# Patient Record
Sex: MALE
[De-identification: names, ages, dates, MRNs, and addresses within clinical notes are randomized per-mention and may not be internally consistent; named-entity substitution may affect disease eponyms.]

---

## 2022-09-15 ENCOUNTER — APPOINTMENT (OUTPATIENT)
Dept: ORTHOPEDIC SURGERY | Facility: CLINIC | Age: 72
End: 2022-09-15

## 2022-09-15 VITALS — WEIGHT: 250 LBS | HEIGHT: 69 IN | BODY MASS INDEX: 37.03 KG/M2

## 2022-09-15 DIAGNOSIS — M79.18 MYALGIA, OTHER SITE: ICD-10-CM

## 2022-09-15 DIAGNOSIS — Z86.39 PERSONAL HISTORY OF OTHER ENDOCRINE, NUTRITIONAL AND METABOLIC DISEASE: ICD-10-CM

## 2022-09-15 PROCEDURE — J3490M: CUSTOM

## 2022-09-15 PROCEDURE — 99214 OFFICE O/P EST MOD 30 MIN: CPT | Mod: 25

## 2022-09-15 PROCEDURE — 20610 DRAIN/INJ JOINT/BURSA W/O US: CPT

## 2022-09-15 PROCEDURE — 73564 X-RAY EXAM KNEE 4 OR MORE: CPT | Mod: LT

## 2022-09-15 NOTE — HISTORY OF PRESENT ILLNESS
[de-identified] : 72 year old male  ( RHD, retired )  left knee chronic pain worsen 8/2022 has seen Dr Chambers back on 7/2021 \par The pain is located  anterior, medial \par The pain is associated with  weakness, swelling , stiffness\par Worse with activity and better at rest.\par Has tried ice, icey hot, Tylenol \par

## 2022-09-15 NOTE — ASSESSMENT
[FreeTextEntry1] : Left X-Ray Examination of the KNEE (4 views): medial and patellofemoral degenerate changes.\par \par - We discussed their diagnosis and treatment options at length including the risks and benefits of both surgical and non-surgical options.\par - We will continue conservative treatment with activity modification, PT, icing, weight loss, and anti-inflammatory medications.\par - The patient was provided with a PT prescription to work on ROM, hip ER/abductors strengthening, quad/hamstring stretches and strengthening, and other exercises \par - The patient was advised to let pain guide the gradual advancement of activities. \par - We also discussed the possible of a corticosteroid injection in order to help decrease inflammation and pain so that they can perform better therapy.\par - The risks, benefits, and alternatives to corticosteroid injection were reviewed with the patient and they wished to proceed with this treatment course. \par - Follow up as needed  to re-evaluate, if no improvement we spoke about possibility of viscosupplementation injections\par

## 2022-09-15 NOTE — IMAGING
[de-identified] : \par LEFT KNEE\par Inspection:  mild effusion\par Palpation: medial joint line tenderness, anterior tenderness\par Knee Range of Motion:  3-125 \par Strength: 5/5 Quadriceps strength, 5/5 Hamstring strength\par Neurological: light touch is intact throughout\par Ligament Stability and Special Tests: \par McMurrays: neg\par Lachman: neg\par Pivot Shift: neg\par Posterior Drawer: neg\par Valgus: neg\par Varus: neg\par Patella Apprehension: neg\par Patella Maltracking: neg\par

## 2023-02-13 ENCOUNTER — APPOINTMENT (OUTPATIENT)
Dept: ORTHOPEDIC SURGERY | Facility: CLINIC | Age: 73
End: 2023-02-13
Payer: MEDICARE

## 2023-02-13 PROCEDURE — 99214 OFFICE O/P EST MOD 30 MIN: CPT | Mod: 25

## 2023-02-13 PROCEDURE — 20610 DRAIN/INJ JOINT/BURSA W/O US: CPT | Mod: LT

## 2023-02-13 PROCEDURE — J3490M: CUSTOM | Mod: NC

## 2023-02-13 NOTE — ASSESSMENT
[FreeTextEntry1] : \par \par - We discussed their diagnosis and treatment options at length including the risks and benefits of both surgical and non-surgical options.\par - We will continue conservative treatment with activity modification, PT, icing, weight loss, and anti-inflammatory medications.\par - The patient was provided with a PT prescription to work on ROM, hip ER/abductors strengthening, quad/hamstring stretches and strengthening, and other exercises \par - The patient was advised to let pain guide the gradual advancement of activities. \par - We also discussed the possible of a corticosteroid injection in order to help decrease inflammation and pain so that they can perform better therapy.\par - The risks, benefits, and alternatives to corticosteroid injection were reviewed with the patient and they wished to proceed with this treatment course. \par - Follow up as needed  to re-evaluate, if no improvement we spoke about possibility of viscosupplementation injections\par

## 2023-02-13 NOTE — HISTORY OF PRESENT ILLNESS
[de-identified] : 72 year old male  ( RHD, retired )  left knee chronic pain worsen 8/2022 has seen Dr Chambers back on 7/2021 \par The pain is located  anterior, medial \par The pain is associated with  weakness, swelling , stiffness\par Worse with activity and better at rest.\par Has tried ice, icey hot, Tylenol \par  \par 2/13/23- had CSI (9/15) with relief sent for PT, but now pain returns since jan 2023

## 2023-02-13 NOTE — IMAGING
[de-identified] : \par LEFT KNEE\par Inspection:  mild effusion\par Palpation: medial joint line tenderness, anterior tenderness\par Knee Range of Motion:  3-125 \par Strength: 5/5 Quadriceps strength, 5/5 Hamstring strength\par Neurological: light touch is intact throughout\par Ligament Stability and Special Tests: \par McMurrays: neg\par Lachman: neg\par Pivot Shift: neg\par Posterior Drawer: neg\par Valgus: neg\par Varus: neg\par Patella Apprehension: neg\par Patella Maltracking: neg\par

## 2024-03-25 ENCOUNTER — APPOINTMENT (OUTPATIENT)
Dept: ORTHOPEDIC SURGERY | Facility: CLINIC | Age: 74
End: 2024-03-25
Payer: MEDICARE

## 2024-03-25 DIAGNOSIS — M17.12 UNILATERAL PRIMARY OSTEOARTHRITIS, LEFT KNEE: ICD-10-CM

## 2024-03-25 PROCEDURE — 99214 OFFICE O/P EST MOD 30 MIN: CPT | Mod: 25

## 2024-03-25 PROCEDURE — 73564 X-RAY EXAM KNEE 4 OR MORE: CPT | Mod: LT

## 2024-03-25 PROCEDURE — J3490M: CUSTOM

## 2024-03-25 PROCEDURE — 20610 DRAIN/INJ JOINT/BURSA W/O US: CPT | Mod: LT

## 2024-03-25 NOTE — HISTORY OF PRESENT ILLNESS
[de-identified] : 73 year old male  ( RHD, retired )  left knee chronic pain worsen 8/2022 has seen Dr Chambers back on 7/2021  The pain is located  anterior, medial  The pain is associated with  weakness, swelling , stiffness Worse with activity and better at rest. Has tried ice, icey hot, Tylenol    2/13/23- had CSI (9/15) with relief sent for PT, but now pain returns since jan 2023 3/25/24- L knee CSI(in feb 2023) with relief, sent to PT,  pain returned 1/2024 worse with activity

## 2024-03-25 NOTE — IMAGING
[de-identified] : \par  LEFT KNEE\par  Inspection:  mild effusion\par  Palpation: medial joint line tenderness, anterior tenderness\par  Knee Range of Motion:  3-125 \par  Strength: 5/5 Quadriceps strength, 5/5 Hamstring strength\par  Neurological: light touch is intact throughout\par  Ligament Stability and Special Tests: \par  McMurrays: neg\par  Lachman: neg\par  Pivot Shift: neg\par  Posterior Drawer: neg\par  Valgus: neg\par  Varus: neg\par  Patella Apprehension: neg\par  Patella Maltracking: neg\par

## 2025-04-08 ENCOUNTER — OFFICE (OUTPATIENT)
Facility: LOCATION | Age: 75
Setting detail: OPHTHALMOLOGY
End: 2025-04-08
Payer: MEDICARE

## 2025-04-08 ENCOUNTER — RX ONLY (RX ONLY)
Age: 75
End: 2025-04-08

## 2025-04-08 DIAGNOSIS — H40.013: ICD-10-CM

## 2025-04-08 DIAGNOSIS — H25.11: ICD-10-CM

## 2025-04-08 DIAGNOSIS — E11.3293: ICD-10-CM

## 2025-04-08 DIAGNOSIS — Z96.1: ICD-10-CM

## 2025-04-08 PROCEDURE — 92004 COMPRE OPH EXAM NEW PT 1/>: CPT | Performed by: OPHTHALMOLOGY

## 2025-04-08 PROCEDURE — 76514 ECHO EXAM OF EYE THICKNESS: CPT | Performed by: OPHTHALMOLOGY

## 2025-04-08 PROCEDURE — 92250 FUNDUS PHOTOGRAPHY W/I&R: CPT | Performed by: OPHTHALMOLOGY

## 2025-04-08 PROCEDURE — 92083 EXTENDED VISUAL FIELD XM: CPT | Performed by: OPHTHALMOLOGY

## 2025-04-08 PROCEDURE — 92020 GONIOSCOPY: CPT | Performed by: OPHTHALMOLOGY

## 2025-04-08 ASSESSMENT — KERATOMETRY
OD_K2POWER_DIOPTERS: 40.25
OS_K1POWER_DIOPTERS: 40.00
OD_K1POWER_DIOPTERS: 40.25
OD_AXISANGLE_DEGREES: 090
OS_K2POWER_DIOPTERS: 40.25
OS_AXISANGLE_DEGREES: 146

## 2025-04-08 ASSESSMENT — REFRACTION_AUTOREFRACTION
OD_AXIS: 056
OD_CYLINDER: +0.50
OS_CYLINDER: -0.50
OS_AXIS: 044
OS_SPHERE: +1.00
OD_SPHERE: +1.50

## 2025-04-08 ASSESSMENT — VISUAL ACUITY
OD_BCVA: 20/25+2
OS_BCVA: 20/30-2

## 2025-04-08 ASSESSMENT — REFRACTION_CURRENTRX
OD_AXIS: 098
OS_CYLINDER: 0.00
OS_OVR_VA: 20/
OD_SPHERE: +1.25
OD_CYLINDER: -0.25
OD_OVR_VA: 20/
OS_AXIS: 180
OS_SPHERE: +1.25

## 2025-04-08 ASSESSMENT — PACHYMETRY
OD_CT_CORRECTION: -1
OS_CT_CORRECTION: 0
OD_CT_UM: 554
OS_CT_UM: 549

## 2025-04-08 ASSESSMENT — CONFRONTATIONAL VISUAL FIELD TEST (CVF)
OS_FINDINGS: FULL
OD_FINDINGS: FULL

## 2025-05-08 ENCOUNTER — RX ONLY (RX ONLY)
Age: 75
End: 2025-05-08

## 2025-05-08 ENCOUNTER — OFFICE (OUTPATIENT)
Facility: LOCATION | Age: 75
Setting detail: OPHTHALMOLOGY
End: 2025-05-08
Payer: MEDICARE

## 2025-05-08 DIAGNOSIS — H40.013: ICD-10-CM

## 2025-05-08 DIAGNOSIS — H25.11: ICD-10-CM

## 2025-05-08 PROCEDURE — 92133 CPTRZD OPH DX IMG PST SGM ON: CPT | Performed by: OPHTHALMOLOGY

## 2025-05-08 PROCEDURE — 92014 COMPRE OPH EXAM EST PT 1/>: CPT | Performed by: OPHTHALMOLOGY

## 2025-05-08 ASSESSMENT — REFRACTION_CURRENTRX
OD_OVR_VA: 20/
OS_CYLINDER: 0.00
OS_AXIS: 180
OD_CYLINDER: -0.25
OD_AXIS: 098
OS_OVR_VA: 20/
OD_SPHERE: +1.25
OS_SPHERE: +1.25

## 2025-05-08 ASSESSMENT — KERATOMETRY
OS_K2POWER_DIOPTERS: 40.25
OS_K1POWER_DIOPTERS: 40.00
OD_AXISANGLE_DEGREES: 090
OS_AXISANGLE_DEGREES: 146
OD_K1POWER_DIOPTERS: 40.25
OD_K2POWER_DIOPTERS: 40.25

## 2025-05-08 ASSESSMENT — REFRACTION_AUTOREFRACTION
OD_AXIS: 056
OD_CYLINDER: +0.50
OS_AXIS: 044
OS_SPHERE: +1.00
OS_CYLINDER: -0.50
OD_SPHERE: +1.50

## 2025-05-08 ASSESSMENT — PACHYMETRY
OS_CT_UM: 549
OS_CT_CORRECTION: 0
OD_CT_CORRECTION: -1
OD_CT_UM: 554

## 2025-05-08 ASSESSMENT — VISUAL ACUITY
OD_BCVA: 20/25
OS_BCVA: 20/30

## 2025-05-08 ASSESSMENT — CONFRONTATIONAL VISUAL FIELD TEST (CVF)
OS_FINDINGS: FULL
OD_FINDINGS: FULL

## 2025-08-08 ENCOUNTER — OFFICE (OUTPATIENT)
Facility: LOCATION | Age: 75
Setting detail: OPHTHALMOLOGY
End: 2025-08-08
Payer: MEDICARE

## 2025-08-08 DIAGNOSIS — H40.013: ICD-10-CM

## 2025-08-08 DIAGNOSIS — H25.11: ICD-10-CM

## 2025-08-08 PROCEDURE — 99213 OFFICE O/P EST LOW 20 MIN: CPT | Performed by: OPHTHALMOLOGY

## 2025-08-08 ASSESSMENT — KERATOMETRY
OS_K1POWER_DIOPTERS: 40.00
OD_AXISANGLE_DEGREES: 012
OD_K2POWER_DIOPTERS: 40.25
OS_AXISANGLE_DEGREES: 001
OS_K2POWER_DIOPTERS: 40.50
OD_K1POWER_DIOPTERS: 40.00

## 2025-08-08 ASSESSMENT — PACHYMETRY
OS_CT_UM: 549
OD_CT_CORRECTION: -1
OD_CT_UM: 554
OS_CT_CORRECTION: 0

## 2025-08-08 ASSESSMENT — REFRACTION_CURRENTRX
OD_SPHERE: +1.25
OS_CYLINDER: 0.00
OS_SPHERE: +1.00
OD_CYLINDER: -0.25
OD_VPRISM_DIRECTION: PROGS
OD_AXIS: 124
OS_VPRISM_DIRECTION: PROGS
OD_ADD: +2.75
OS_AXIS: 180
OS_OVR_VA: 20/
OS_ADD: +2.75
OD_OVR_VA: 20/

## 2025-08-08 ASSESSMENT — REFRACTION_AUTOREFRACTION
OD_AXIS: 091
OS_AXIS: 086
OS_SPHERE: +1.50
OD_CYLINDER: -0.50
OS_CYLINDER: -0.75
OD_SPHERE: +2.25

## 2025-08-08 ASSESSMENT — CONFRONTATIONAL VISUAL FIELD TEST (CVF)
OS_FINDINGS: FULL
OD_FINDINGS: FULL

## 2025-08-08 ASSESSMENT — VISUAL ACUITY
OS_BCVA: 20/50-2
OD_BCVA: 20/30-2